# Patient Record
Sex: MALE | Race: OTHER | NOT HISPANIC OR LATINO
[De-identification: names, ages, dates, MRNs, and addresses within clinical notes are randomized per-mention and may not be internally consistent; named-entity substitution may affect disease eponyms.]

---

## 2020-07-29 PROBLEM — Z00.00 ENCOUNTER FOR PREVENTIVE HEALTH EXAMINATION: Status: ACTIVE | Noted: 2020-07-29

## 2020-07-30 ENCOUNTER — APPOINTMENT (OUTPATIENT)
Dept: NEUROLOGY | Facility: CLINIC | Age: 45
End: 2020-07-30
Payer: COMMERCIAL

## 2020-07-30 DIAGNOSIS — Z82.49 FAMILY HISTORY OF ISCHEMIC HEART DISEASE AND OTHER DISEASES OF THE CIRCULATORY SYSTEM: ICD-10-CM

## 2020-07-30 DIAGNOSIS — Z83.438 FAMILY HISTORY OF OTHER DISORDER OF LIPOPROTEIN METABOLISM AND OTHER LIPIDEMIA: ICD-10-CM

## 2020-07-30 DIAGNOSIS — K22.70 BARRETT'S ESOPHAGUS W/OUT DYSPLASIA: ICD-10-CM

## 2020-07-30 DIAGNOSIS — H81.09 MENIERE'S DISEASE, UNSPECIFIED EAR: ICD-10-CM

## 2020-07-30 DIAGNOSIS — Z83.3 FAMILY HISTORY OF DIABETES MELLITUS: ICD-10-CM

## 2020-07-30 DIAGNOSIS — K76.0 FATTY (CHANGE OF) LIVER, NOT ELSEWHERE CLASSIFIED: ICD-10-CM

## 2020-07-30 DIAGNOSIS — Z78.9 OTHER SPECIFIED HEALTH STATUS: ICD-10-CM

## 2020-07-30 DIAGNOSIS — E03.9 HYPOTHYROIDISM, UNSPECIFIED: ICD-10-CM

## 2020-07-30 PROCEDURE — 99203 OFFICE O/P NEW LOW 30 MIN: CPT | Mod: 95

## 2020-07-30 RX ORDER — CHROMIUM 200 MCG
1000 TABLET ORAL DAILY
Refills: 0 | Status: ACTIVE | COMMUNITY

## 2020-07-30 RX ORDER — ACETAZOLAMIDE 125 MG/1
125 TABLET ORAL TWICE DAILY
Qty: 60 | Refills: 5 | Status: ACTIVE | COMMUNITY
Start: 2020-07-30 | End: 1900-01-01

## 2020-07-30 RX ORDER — LEVOTHYROXINE SODIUM 125 UG/1
125 TABLET ORAL
Refills: 0 | Status: ACTIVE | COMMUNITY

## 2020-07-30 NOTE — HISTORY OF PRESENT ILLNESS
[Home] : at home, [unfilled] , at the time of the visit. [Other Location: e.g. Home (Enter Location, City,State)___] : at [unfilled] [Verbal consent obtained from patient] : the patient, [unfilled] [FreeTextEntry1] : Start of visit 9:39,  End of visit:  10:20. Duration 41 min.\par \par Pt is 45 yom with prior history of Meniere's disease referred by ENT for neurologic evaluation.  Pt reports symptoms of feeling pressure in his ears, headaches, lightheadedness and sometimes for a few seconds feels that his footing is not as strong, feels like going to have imbalance.  Sometimes when he is waking up in the morning feels that ceiling is moving counterclockwise.  Goes back since 2013 he states.  Diagnosed by Dr. Westbrook and Dr. Fletcher.  Started in 2013 then end of 2015 fully recovered.  2016 until this July 2020 felt everything was over, then started again this month.  The insurance he has currently ENT doesn't participate in.  Has been seeing Dr. Lucy Nieves on [address]  PCP had given him Mecline to get him through this 25 mg ,knocked him out the first or second week.  Was sleeping 12-15 hours and interferred.  In past was on triamterene but made his BP very low.  He is on a low sodium diet.  Some changes in his weight.  Weight last September 212-215, now at 190 lbs.  Has ringing in left ear .  Had audiogram a couple of weeks ago showing hearing loss bilaterally L > R.  \par \par States when it restarted had a long trip on train, hear a pop in left ear, felt like something broke, ever since tinnitus hasn't stopped.  That was 2011. PItch is mid tone.\par \par last 2 days sx's better, \par but 3-4 days a week 12 seconds to 20 minutes (usually in am).\par

## 2020-07-30 NOTE — PHYSICAL EXAM
[FreeTextEntry1] : A&O x 3. Speech fluent, language intact, general fund of knowledge intact. Recent  and remote memory intact.\par EOM's full.  + GEN worse when looking to left\par finger rub decreased left ear.\par Normal LT V1-3 bilat.\par tongue midline, palate elevated symmetrically.\par VF full bilat\par \par upper/lower extremities seem strong, raises arms above head, no drift.\par Rises from chair w/o using arms.\par Able to flex at hip w/o difficulty.\par \par Finger to nose intact.\par \par Gait: normal tandeme, heel and toe walk.\par \par \par \par \par

## 2020-07-30 NOTE — ASSESSMENT
[FreeTextEntry1] : Meniere's disease.\par \par Recommend:\par 1.  Low sodium diet.\par 2.  Eliminate caffeine from diet - if tea, herbal tea only.\par 3.  Begin acetazolamide 125 mg po bid starting today.\par 4. Return in 3 months.\par 5.  Call with questions or concerns.\par \par 41 min face-to-face time with > 50% of time spent in counselling and coordination of care.

## 2020-07-30 NOTE — REVIEW OF SYSTEMS
[Fever] : fever [Feeling Tired] : feeling tired [Recent Weight Loss (___ Lbs)] : recent [unfilled] ~Ulb weight loss [Anxiety] : anxiety [Depression] : depression [Confused or Disoriented] : confusion [Memory Lapses or Loss] : memory loss [Numbness] : numbness [Lightheadedness] : lightheadedness [Vertigo] : vertigo [Tension Headache] : tension-type headaches [Difficulty Walking] : difficulty walking [Loss Of Hearing] : hearing loss [Chills] : no chills [Feeling Poorly] : not feeling poorly [Recent Weight Gain (___ Lbs)] : no recent weight gain [Sleep Disturbances] : no sleep disturbances [Suicidal] : not suicidal [Facial Weakness] : no facial weakness [Decr. Concentrating Ability] : no decrease in concentrating ability [Arm Weakness] : no arm weakness [Hand Weakness] : no hand weakness [Leg Weakness] : no leg weakness [Tingling] : no tingling [Seizures] : no convulsions [Dizziness] : no dizziness [Fainting] : no fainting [Cluster Headache] : no cluster headache [Inability to Walk] : able to walk [Ataxia] : no ataxia [Migraine Headache] : no migraine headache [Eyesight Problems] : no eyesight problems [Eye Pain] : no eye pain [Earache] : no earache [Heart Rate Is Slow] : the heart rate was not slow [Heart Rate Is Fast] : the heart rate was not fast [Palpitations] : no palpitations [Chest Pain] : no chest pain [Shortness Of Breath] : no shortness of breath [Wheezing] : no wheezing [Vomiting] : no vomiting [Cough] : no cough [Abdominal Pain] : no abdominal pain [Constipation] : no constipation [Diarrhea] : no diarrhea [Dysuria] : no dysuria [Heartburn] : no heartburn [Melena] : no melena [Incontinence] : no incontinence [Joint Pain] : no joint pain [Joint Swelling] : no joint swelling [Joint Stiffness] : no joint stiffness [Skin Wound] : no skin wound [Skin Lesions] : no skin lesions [Hot Flashes] : no hot flashes [Muscle Weakness] : no muscle weakness [Easy Bleeding] : no tendency for easy bleeding [Easy Bruising] : no tendency for easy bruising [FreeTextEntry2] : 21 July Covid test for fever x 2 days > 100.  Fever resolved, test results pending [de-identified] : minor memory/concentration\par sometimes left arm numbness after sleeping on it.\par CC vertigo\par intermittent walking difficulty [FreeTextEntry3] : feels eyes are not well rested [FreeTextEntry4] : L > R [FreeTextEntry9] : forearm to elbow discomfort

## 2020-08-03 ENCOUNTER — APPOINTMENT (OUTPATIENT)
Dept: OTOLARYNGOLOGY | Facility: CLINIC | Age: 45
End: 2020-08-03